# Patient Record
Sex: MALE | Race: WHITE | NOT HISPANIC OR LATINO | URBAN - NONMETROPOLITAN AREA
[De-identification: names, ages, dates, MRNs, and addresses within clinical notes are randomized per-mention and may not be internally consistent; named-entity substitution may affect disease eponyms.]

---

## 2017-09-18 NOTE — PATIENT DISCUSSION
(H25.11) Age-related nuclear cataract, right eye - Assesment : Examination revealed cataract. Mild at this time. - Plan : Monitor for changes. Advised patient to call our office with decreased vision or increased symptoms.

## 2017-09-18 NOTE — PATIENT DISCUSSION
(H53.2) Diplopia - Assesment : Examination revealed diplopia. 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 Advised that diabetic changes could contribute to this. 9 D SHRUTHI prism OU given in new Rx. - Plan : Explanation of condition given to patient. Condition correctable with 9 D SHRUTHI prism OU, but will likely resolve over time as this condition recovers. New Rx w/ prism given to patient, but explained that prism is likely to change. RV in 2 Months for Follow Up and Prism Recheck.

## 2017-09-18 NOTE — PATIENT DISCUSSION
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 - Plan : See Plan #1.

## 2017-11-07 NOTE — PATIENT DISCUSSION
(H25.13) Age-related nuclear cataract, bilateral - Assesment : Examination revealed cataract. OD>OS. - Plan : Monitor for changes. Advised patient to call our office with decreased vision or increased symptoms.

## 2017-11-07 NOTE — PATIENT DISCUSSION
(H53.2) Diplopia - Assesment : Examination revealed diplopia resolves with 9 D SHRUTHI prism in glasses . 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 - Plan : Monitor for changes.  Continue to Use glasses with Prisim correction Rtc 6 months Exam

## 2022-01-28 ENCOUNTER — EMERGENCY VISIT (OUTPATIENT)
Dept: URBAN - NONMETROPOLITAN AREA CLINIC 4 | Facility: CLINIC | Age: 67
End: 2022-01-28

## 2022-01-28 DIAGNOSIS — H43.812: ICD-10-CM

## 2022-01-28 DIAGNOSIS — H43.811: ICD-10-CM

## 2022-01-28 PROCEDURE — 99204 OFFICE O/P NEW MOD 45 MIN: CPT

## 2022-01-28 ASSESSMENT — VISUAL ACUITY
OS_SC: 20/25
OD_SC: 20/30
OU_CC: 20/30

## 2022-01-28 ASSESSMENT — TONOMETRY
OD_IOP_MMHG: 14
OS_IOP_MMHG: 14

## 2022-01-28 NOTE — PATIENT DISCUSSION
(Observe) Observe for now without intervention. The patient was advised to contact office with any change or worsening of vision.
(Without Tear) No holes, tears or detachments. Patient cautioned to call our office immediately if they experience a substantial change in their symptoms such as an increase in floaters, persistent flashes, loss of visual field (may appear as a shadow or a curtain) or decrease in visual acuity as these may indicate a retinal tear or detachment.
An examination for this condition will need to be performed at the next visit.  Will get OCT Mac at f/u.
Patient understands condition, prognosis and need for follow up care.
RD precautions.
Spine appears normal, range of motion is not limited, no muscle or joint tenderness

## 2022-02-24 ENCOUNTER — COMPREHENSIVE EXAM (OUTPATIENT)
Dept: URBAN - NONMETROPOLITAN AREA CLINIC 4 | Facility: CLINIC | Age: 67
End: 2022-02-24

## 2022-02-24 DIAGNOSIS — H43.812: ICD-10-CM

## 2022-02-24 DIAGNOSIS — H52.03: ICD-10-CM

## 2022-02-24 DIAGNOSIS — H35.373: ICD-10-CM

## 2022-02-24 DIAGNOSIS — H43.811: ICD-10-CM

## 2022-02-24 PROCEDURE — 92015 DETERMINE REFRACTIVE STATE: CPT

## 2022-02-24 PROCEDURE — 99214 OFFICE O/P EST MOD 30 MIN: CPT

## 2022-02-24 ASSESSMENT — TONOMETRY
OS_IOP_MMHG: 12
OD_IOP_MMHG: 13

## 2022-02-24 ASSESSMENT — VISUAL ACUITY
OD_SC: 20/50+1
OS_SC: 20/30-1

## 2022-02-24 NOTE — PATIENT DISCUSSION
An examination for this condition will need to be performed at the next visit.  Will get OCT Mac at f/u.

## 2022-11-22 NOTE — PATIENT DISCUSSION
Artificial Tears: One drop to both eyes 3-4 times daily. We recommendRefresh lubricating eye drops which can be found at any pharmacy.